# Patient Record
(demographics unavailable — no encounter records)

---

## 2024-10-25 NOTE — PLAN
[FreeTextEntry1] : Tailbone Pain/Acute Low back Pain  Acute x2 weeks. Given mechanism of action, physical exam, and gradual improvement, likely coccydnia and reassured that likely to self-resolve within the next several weeks. However, given age and point tenderness to midline lumbar spine also ordered xrays of the lumbosacral spine to r/o fracture (though less concerned regarding this) Plan; - pain management: tylenol 1000mg TID, patches (lidocaine, salon pas, heat) - can trial the donut pillow  - return if not improved, or ED precautions with acute changes (inability to ambulate, urinary retention)

## 2024-10-25 NOTE — HISTORY OF PRESENT ILLNESS
[FreeTextEntry8] : Patient is an 85 year old F w CAD here to discuss:  Acute Low Back / Tailbone Pain  Tripped over a cat toy on the ground 2 weeks prior with pain in her tailbone since then.  Intermittently worse; unable to sleep on back Has overall gradually improved since initial incident (initially pain all the time, now more intermittent) no pain management at this time no change in urination, no numbness in legs denies any headaches or head trauma  able to ambulate with cane, but prior to incident didn't need the cane as much (though daughter reports patient didn't have the best stability even before the incident) [Family Member] : family member

## 2024-10-25 NOTE — PHYSICAL EXAM
[Normal] : no joint swelling and grossly normal strength and tone [Coordination Grossly Intact] : coordination grossly intact [No Focal Deficits] : no focal deficits [de-identified] : pinpoint tenderness around L2-4, and at the tailbone; neg straight leg raise bilaterally; neg trendelenberg gait

## 2024-12-04 NOTE — PHYSICAL EXAM
[No Acute Distress] : no acute distress [Well Nourished] : well nourished [Normal Oropharynx] : the oropharynx was normal [No Lymphadenopathy] : no lymphadenopathy [Clear to Auscultation] : lungs were clear to auscultation bilaterally [No Edema] : there was no peripheral edema [Normal] : affect was normal and insight and judgment were intact

## 2024-12-05 NOTE — HEALTH RISK ASSESSMENT
[No] : No [1 or 2 (0 pts)] : 1 or 2 (0 points) [Never (0 pts)] : Never (0 points) [No falls in past year] : Patient reported no falls in the past year [0] : 2) Feeling down, depressed, or hopeless: Not at all (0) [PHQ-2 Negative - No further assessment needed] : PHQ-2 Negative - No further assessment needed [Never] : Never [Audit-CScore] : 0 [[id number]] : 0

## 2024-12-05 NOTE — HISTORY OF PRESENT ILLNESS
[FreeTextEntry8] : Ms. VALERIA PARDO is a 85 year old female here today for fatigue.  Not sleeping well. Wakes up alot. Naps some during the day. At her last visit she was told to reduce her thyroid dose but patient didn't reduce the level. B12 level was found to be low but she never started a B12 supplement.

## 2024-12-05 NOTE — HEALTH RISK ASSESSMENT
[No] : No [1 or 2 (0 pts)] : 1 or 2 (0 points) [Never (0 pts)] : Never (0 points) [No falls in past year] : Patient reported no falls in the past year [0] : 2) Feeling down, depressed, or hopeless: Not at all (0) [PHQ-2 Negative - No further assessment needed] : PHQ-2 Negative - No further assessment needed [Never] : Never [Audit-CScore] : 0 [[id number]] : 0

## 2024-12-05 NOTE — REVIEW OF SYSTEMS
[Fatigue] : fatigue [Recent Change In Weight] : ~T recent weight change [Dyspnea on Exertion] : dyspnea on exertion [Insomnia] : insomnia [Negative] : Gastrointestinal [Dysuria] : no dysuria [Frequency] : no frequency [FreeTextEntry2] : Losing weight [FreeTextEntry1] : Feels cold

## 2024-12-26 NOTE — PLAN
[FreeTextEntry1] : Right Thigh Pain  Acute on chronic with weakness in the left thigh. Pain elicited on palpation of specific point without any particular mass palpated. DDx: muscle strain vs DVT (less likely as no inciting incident, no swelling of the thigh or erythema plan - US of the RLE - thigh to r/o DVT - continue to monitor   Weakness Cold Intolerance Low TSH on labs Patient with very low TSH and vague symptoms x2 in the past 2 weeks (history unclear and patient mildly unreliable as a historian). Patient unwilling to try the 88mcg daily so through shared decision making, will alternate between 88mcg and 100mcg every other day and then repeat levels in 4-6 weeks, with understanding that if still very low that we will then transition to 88mcg daily at that time (& that she will need to stick with that dosing then)  Long conversation that it can be very dangerous to have such low TSH levels and that it could have explained patient's symptoms (especially as it had been low for a while) - that when chronically low if not brought down that patient may have worsening symptoms that can cause hospitalization, death, etc so it is important to decrease the amount of levothyroxine in her system. - CBC, CMP to ensure no other systemic abnormalities occurring at this time

## 2024-12-26 NOTE — PHYSICAL EXAM
[Normal] : no acute distress, well nourished, well developed and well-appearing [No Respiratory Distress] : no respiratory distress  [No Accessory Muscle Use] : no accessory muscle use [Normal Rate] : normal rate  [Grossly Normal Strength/Tone] : grossly normal strength/tone [Coordination Grossly Intact] : coordination grossly intact [No Focal Deficits] : no focal deficits [Normal Gait] : normal gait [Speech Grossly Normal] : speech grossly normal [Normal Affect] : the affect was normal [Normal Mood] : the mood was normal [Normal Insight/Judgement] : insight and judgment were intact [de-identified] : enlarged eyes, which patient reports have been like this for decades  [de-identified] : right thigh with point tenderness in the medial side, no enlargement or erythema or warmth compared to the left

## 2024-12-26 NOTE — HISTORY OF PRESENT ILLNESS
[FreeTextEntry8] : Patient is a 85 year old F with hypothyroidism, mood disorder:  1x episode of cold intolerance with shivering, weakness, malaise yesterday and once 2 weeks ago  of note, missed all of her meds and then took them later now feeling much better but still having some residual right thigh pain with some weakness in the right leg with specific pinpoint tenderness in the right leg of note, patient also had low TSH levels and advised to decrease levothyroxine dosing to 88mcg on 12/4 which patient did for 2 weeks then felt tired so went back up to the 100mcg

## 2025-04-09 NOTE — PHYSICAL EXAM
[Normal] : normal rate, regular rhythm, normal S1 and S2 and no murmur heard [No Edema] : there was no peripheral edema [de-identified] : right foot with point tenderness on palpation of the dip and pip of the pinky and 4th toes; no outward deformity, swelling or bruising noted [de-identified] : low mood

## 2025-04-09 NOTE — HISTORY OF PRESENT ILLNESS
[FreeTextEntry1] : Patient here for follow up [de-identified] : Patient is an 85 year old F here to discuss  right foot stubbed pinky and 4th toe 2 weeks ago still very painful  thyroid  taking the levothyroxine 75mcg daily now  depression daughter thinks patient's depression is worse mood is quite low all the time worsened by the news taking venlafaxine 37.5mg daily   htn initially concerned regarding the swelling but not really having that now   wants to get the same cream given in the hospital (moisturizer) with scabbing and friction against the moles in the center of the chest - due to breasts

## 2025-04-09 NOTE — HISTORY OF PRESENT ILLNESS
[FreeTextEntry1] : Patient here for follow up [de-identified] : Patient is an 85 year old F here to discuss  right foot stubbed pinky and 4th toe 2 weeks ago still very painful  thyroid  taking the levothyroxine 75mcg daily now  depression daughter thinks patient's depression is worse mood is quite low all the time worsened by the news taking venlafaxine 37.5mg daily   htn initially concerned regarding the swelling but not really having that now   wants to get the same cream given in the hospital (moisturizer) with scabbing and friction against the moles in the center of the chest - due to breasts

## 2025-04-09 NOTE — PHYSICAL EXAM
[Normal] : normal rate, regular rhythm, normal S1 and S2 and no murmur heard [No Edema] : there was no peripheral edema [de-identified] : right foot with point tenderness on palpation of the dip and pip of the pinky and 4th toes; no outward deformity, swelling or bruising noted [de-identified] : low mood

## 2025-04-09 NOTE — PLAN
[FreeTextEntry1] : Seborrheic Keratosis present in the center of chest due to breasts, they keep getting rubbed against and are somewhat irritated plan moisturizer ordered referral to derm to discuss removal given the irritation  Hypothyroidism previous dosing was too high but now on the levothyroxine 75mcg daily plan tsh, refill if appropriate dosing otherwise reduce dose again  Right Foot Toe Pain with stubbing 2 weeks ago and persistent pain plan xray to r/o fracture   anxiety/depression per daughter, patient with lower mood in past couple of weeks plan increase venlafaxine to 75mg daily

## 2025-07-25 NOTE — HEALTH RISK ASSESSMENT
[No] : No [No falls in past year] : Patient reported no falls in the past year [0] : 2) Feeling down, depressed, or hopeless: Not at all (0) [Never] : Never [Audit-CScore] : 0 [[id number]] : 0

## 2025-07-25 NOTE — HISTORY OF PRESENT ILLNESS
[FreeTextEntry8] : Ms. VALERIA PARDO is a 86 year old female here today for: 1)Pt complains of pain on the outside of the right ear for two weeks. Pt complains it is sensitive to the touch. Pt complains of weakness in her legs for weeks. 2) c/o weakness    -   Hx of low nml iron- has not taken iron supplements. No change in color of stools and has not seen any blood in her urine.     -  Hx of UTIs- not symptomatic but doesn't always have urinary sxs

## 2025-07-25 NOTE — PHYSICAL EXAM
[No Acute Distress] : no acute distress [Well Nourished] : well nourished [Normal] : affect was normal and insight and judgment were intact [de-identified] : small abrasion on the pinna of the right ear